# Patient Record
Sex: MALE | Race: WHITE | NOT HISPANIC OR LATINO | Employment: FULL TIME | URBAN - METROPOLITAN AREA
[De-identification: names, ages, dates, MRNs, and addresses within clinical notes are randomized per-mention and may not be internally consistent; named-entity substitution may affect disease eponyms.]

---

## 2021-05-28 ENCOUNTER — RECORDS - HEALTHEAST (OUTPATIENT)
Dept: ADMINISTRATIVE | Facility: CLINIC | Age: 66
End: 2021-05-28

## 2022-08-26 ENCOUNTER — APPOINTMENT (OUTPATIENT)
Dept: CT IMAGING | Facility: HOSPITAL | Age: 67
End: 2022-08-26
Payer: COMMERCIAL

## 2022-08-26 ENCOUNTER — HOSPITAL ENCOUNTER (EMERGENCY)
Facility: HOSPITAL | Age: 67
Discharge: HOME | End: 2022-08-26
Admitting: PHYSICIAN ASSISTANT
Payer: COMMERCIAL

## 2022-08-26 VITALS
OXYGEN SATURATION: 100 % | HEART RATE: 73 BPM | DIASTOLIC BLOOD PRESSURE: 91 MMHG | TEMPERATURE: 98 F | SYSTOLIC BLOOD PRESSURE: 163 MMHG | RESPIRATION RATE: 20 BRPM

## 2022-08-26 DIAGNOSIS — R31.0 GROSS HEMATURIA: ICD-10-CM

## 2022-08-26 LAB
ANION GAP SERPL CALCULATED.3IONS-SCNC: 9 MMOL/L (ref 5–18)
APTT PPP: 28 SECONDS (ref 22–38)
BUN SERPL-MCNC: 17 MG/DL (ref 8–22)
C REACTIVE PROTEIN LHE: 0.2 MG/DL (ref 0–?)
CALCIUM SERPL-MCNC: 9.5 MG/DL (ref 8.5–10.5)
CHLORIDE BLD-SCNC: 104 MMOL/L (ref 98–107)
CO2 SERPL-SCNC: 26 MMOL/L (ref 22–31)
CREAT SERPL-MCNC: 0.99 MG/DL (ref 0.7–1.3)
ERYTHROCYTE [DISTWIDTH] IN BLOOD BY AUTOMATED COUNT: 12.9 % (ref 10–15)
GFR SERPL CREATININE-BSD FRML MDRD: 85 ML/MIN/1.73M2
GLUCOSE BLD-MCNC: 97 MG/DL (ref 70–125)
HCT VFR BLD AUTO: 46.2 % (ref 40–53)
HGB BLD-MCNC: 15.6 G/DL (ref 13.3–17.7)
HOLD SPECIMEN: NORMAL
INR PPP: 1.57 (ref 0.85–1.15)
MCH RBC QN AUTO: 32.4 PG (ref 26.5–33)
MCHC RBC AUTO-ENTMCNC: 33.8 G/DL (ref 31.5–36.5)
MCV RBC AUTO: 96 FL (ref 78–100)
PLATELET # BLD AUTO: 206 10E3/UL (ref 150–450)
POTASSIUM BLD-SCNC: 4.4 MMOL/L (ref 3.5–5)
RBC # BLD AUTO: 4.81 10E6/UL (ref 4.4–5.9)
SODIUM SERPL-SCNC: 139 MMOL/L (ref 136–145)
WBC # BLD AUTO: 5.2 10E3/UL (ref 4–11)

## 2022-08-26 PROCEDURE — 86140 C-REACTIVE PROTEIN: CPT | Performed by: PHYSICIAN ASSISTANT

## 2022-08-26 PROCEDURE — 36415 COLL VENOUS BLD VENIPUNCTURE: CPT | Performed by: PHYSICIAN ASSISTANT

## 2022-08-26 PROCEDURE — 85730 THROMBOPLASTIN TIME PARTIAL: CPT | Performed by: PHYSICIAN ASSISTANT

## 2022-08-26 PROCEDURE — 80048 BASIC METABOLIC PNL TOTAL CA: CPT | Performed by: PHYSICIAN ASSISTANT

## 2022-08-26 PROCEDURE — 99284 EMERGENCY DEPT VISIT MOD MDM: CPT | Mod: 25

## 2022-08-26 PROCEDURE — 85027 COMPLETE CBC AUTOMATED: CPT | Performed by: PHYSICIAN ASSISTANT

## 2022-08-26 PROCEDURE — 85610 PROTHROMBIN TIME: CPT | Performed by: PHYSICIAN ASSISTANT

## 2022-08-26 PROCEDURE — 74176 CT ABD & PELVIS W/O CONTRAST: CPT

## 2022-08-26 ASSESSMENT — ACTIVITIES OF DAILY LIVING (ADL): ADLS_ACUITY_SCORE: 35

## 2022-08-26 ASSESSMENT — ENCOUNTER SYMPTOMS
FLANK PAIN: 1
HEMATURIA: 1

## 2022-08-26 NOTE — DISCHARGE INSTRUCTIONS
You have a tiny stone in the left kidney but nothing that would typically be causing blood in your urine or pain and this is likely an incidental finding.    Your kidney function is perfect as is your hemoglobin.     Follow up with [facility] for further non-emergent evaluation.    If you develop new/worsening symptoms including fevers, chills, urinating straight blood, unable to urinate, you need to return to the emergency department.

## 2022-08-26 NOTE — ED TRIAGE NOTES
Patient states blood in urine since Saturday, Tuesday he states there was what he thought to be a small clot. Had clear urine Wednesday and Thursday. Patient states he had blood in his urine again this morning. Right sided flank pain.

## 2022-08-26 NOTE — ED PROVIDER NOTES
EMERGENCY DEPARTMENT ENCOUNTER      NAME: Rene Holcomb  AGE: 67 year old male  YOB: 1955  MRN: [id number]  EVALUATION DATE & TIME: 8/26/2022 12:19 PM    PCP: No primary care provider on file.    ED PROVIDER: Saba Moore PA-C      Chief Complaint   Patient presents with     Hematuria         FINAL IMPRESSION:  1. Gross hematuria          MEDICAL DECISION MAKING:    Pertinent Labs & Imaging studies reviewed. (See chart for details)  67 year old male with a pertinent history of prostate cancer, colon cancer, DVT on xarelto, nephrolithiasis and hyperlipidemia presents to the Emergency Department for evaluation of intermittent hematuria x 1 week with occasional right flank pain. Denies fevers, chills, vomiting, dysuria.     Vitals reviewed and notable for elevated blood pressure. On exam, patient well appearing and in no acute distress. Abdomen soft and non-tender. No flank or CVA tenderness. Differential diagnosis includes but not limited to UTI, ureterolithiasis,  neoplasm, pharmacologic anticoagulation.    UA reviewed from clinic today with no evidence of infection. 3-5 RBCs. Hgb 15.6. INR 1.57. Renal function WNL. No leukocytosis. CRP WNL. CT abd pelvis with punctate 1 mm nonobstructing left renal stone. No other stones. No hydronephrosis. Patient hemodynamically stable, small amount of blood in urine, no anemia or acute renal insufficiency. Appropriate for outpatient urology follow up. Discussed importance given history of  cancer. Discussed return precautions. Urology referral placed and patient knows to follow up. Patient discharged home in stable condition.     0 minutes of critical care time     ED COURSE:  12:21 PM I met with the patient, obtained history, performed an initial exam, and discussed options and plan for diagnostics and treatment here in the ED.  12:34 PM Patient discharged after being provided with extensive anticipatory guidance and given return precautions, importance of  urology follow-up emphasized.    At the conclusion of the encounter I discussed the results of all of the tests and the disposition. The questions were answered. The patient acknowledged understanding and was agreeable with the care plan.     MEDICATIONS GIVEN IN THE EMERGENCY:  Medications - No data to display    NEW PRESCRIPTIONS STARTED AT TODAY'S ER VISIT  There are no discharge medications for this patient.           =================================================================    HPI:    Patient information was obtained from: Patient     Use of Interpretor: N/A       Rene Holcomb is a 67 year old male with a pertinent history of prostate cancer, colon cancer, DVT on xarelto, nephrolithiasis and hyperlipidemia who presents to this ED via private vehicle for evaluation of hematuria.    Patient reports intermittent hematuria for the last week. Occasional right flank pain. Anticoagulated on xarelto. Denies fevers, chills, dysuria, nausea, vomiting.    Per chart review: urinalysis done [facility] at [facility] prior to coming in to the ED. Results are below.     (ABNORMAL) URINALYSIS MICROSCOPIC (08/26/2022 9:36 AM CDT)  Lab Results - (ABNORMAL) URINALYSIS MICROSCOPIC (08/26/2022 9:36 AM CDT)  Component Value Ref Range Test Method Analysis Time Performed At Pathologist Signature   RBC 3-5 (A) 0-2, None Seen /HPF   08/26/2022 10:01 AM CDT [facility]     WBC 0-2 0-2, 3-5, None Seen /HPF   08/26/2022 10:01 AM CDT [facility]     BACTERIA  None Seen None Seen, Rare, Few Bacteria/HPF   08/26/2022 10:01 AM CDT [facility]     EPITHELIAL CELLS  Few None Seen, Few Epi/HPF   08/26/2022 10:01 AM CDT [facility]     Mucus Present     08/26/2022 10:01 AM CDT [facility]     GRANULAR CASTS 0-2 (A) (none) /LPF    08/26/2022 10:01 AM CDT [facility]       (ABNORMAL) UA W/ SEDIMENT EXAM REFLEXED PER  CRITERIA (08/26/2022 9:36 AM CDT)  Lab Results - (ABNORMAL) UA W/ SEDIMENT EXAM REFLEXED PER CRITERIA (08/26/2022 9:36 AM CDT)  Component Value Ref Range Test Method Analysis Time Performed At Pathologist Signature   COLOR  Yellow Yellow Color   08/26/2022 9:41 AM CDT [facility]     CLARITY  Clear Clear Clarity   08/26/2022 9:41 AM CDT [facility]     SPECIFIC GRAVITY,URINE  >=1.030 (A) 1.010, 1.015, 1.020, 1.025    08/26/2022 9:41 AM CDT [facility]     PH,URINE  6.0 6.0, 7.0, 8.0, 5.5, 6.5, 7.5, 8.5    08/26/2022 9:41 AM CDT [facility]     UROBILINOGEN,QUALITATIVE  Normal Normal EU/dl   08/26/2022 9:41 AM CDT [facility]     PROTEIN, URINE Negative Negative mg/dL   08/26/2022 9:41 AM CDT [facility]     GLUCOSE, URINE Negative Negative mg/dL   08/26/2022 9:41 AM CDT [facility]     KETONES,URINE  Negative Negative mg/dL   08/26/2022 9:41 AM CDT [facility]     BILIRUBIN,URINE  Negative Negative    08/26/2022 9:41 AM CDT [facility]     OCCULT BLOOD,URINE  Moderate (A) Negative    08/26/2022 9:41 AM CDT [facility]     NITRITE  Negative Negative    08/26/2022 9:41 AM CDT [facility]     LEUKOCYTE ESTERASE  Negative Negative    08/26/2022 9:41 AM CDT [facility]          REVIEW OF SYSTEMS:  Review of Systems   Constitutional: Negative for chills and fever.   Respiratory: Negative for shortness of breath.    Cardiovascular: Negative for chest pain.   Gastrointestinal: Negative for abdominal pain, diarrhea, nausea and vomiting.   Genitourinary: Positive for flank pain (Right) and hematuria. Negative for dysuria, frequency, penile discharge and penile pain.   Hematological: Bruises/bleeds easily (xarelto).   All other systems reviewed and are negative.      PAST MEDICAL HISTORY:  History reviewed. No  pertinent past medical history.    PAST SURGICAL HISTORY:  No past surgical history on file.        CURRENT MEDICATIONS:    No current facility-administered medications for this encounter.  No current outpatient medications on file.      ALLERGIES:  Not on File    FAMILY HISTORY:  No family history on file.    SOCIAL HISTORY:        VITALS:  Patient Vitals for the past 24 hrs:   BP Temp Temp src Pulse Resp SpO2   08/26/22 1057 (!) 163/91 98  F (36.7  C) Temporal 73 20 100 %       PHYSICAL EXAM    Constitutional: Well developed, Well nourished, NAD  HENT: Normocephalic, Atraumatic  Neck: Supple, No stridor.  Eyes: Conjunctiva normal, No discharge.   Respiratory: Normal breath sounds, No respiratory distress, No wheezing, Speaks full sentences easily. No cough.  Cardiovascular: Normal heart rate, Regular rhythm, No murmurs, No rubs, No gallops. Chest wall nontender.  GI: Soft, No tenderness, No masses, No flank or CVA tenderness. No rebound or guarding.  Musculoskeletal: No edema. No cyanosis, No clubbing. Good range of motion in all major joints.  Integument: Warm, Dry, No erythema, No rash. No petechiae.  Neurologic: Alert & oriented x 3, Normal motor function, Normal sensory function, No focal deficits noted. Normal gait.  Psychiatric: Affect normal, Judgment normal, Mood normal. Cooperative.    LAB:  All pertinent labs reviewed and interpreted.  Recent Results (from the past 24 hour(s))   CBC (+ platelets, no diff)    Collection Time: 08/26/22 11:40 AM   Result Value Ref Range    WBC Count 5.2 4.0 - 11.0 10e3/uL    RBC Count 4.81 4.40 - 5.90 10e6/uL    Hemoglobin 15.6 13.3 - 17.7 g/dL    Hematocrit 46.2 40.0 - 53.0 %    MCV 96 78 - 100 fL    MCH 32.4 26.5 - 33.0 pg    MCHC 33.8 31.5 - 36.5 g/dL    RDW 12.9 10.0 - 15.0 %    Platelet Count 206 150 - 450 10e3/uL   Basic metabolic panel    Collection Time: 08/26/22 11:40 AM   Result Value Ref Range    Sodium 139 136 - 145 mmol/L    Potassium 4.4 3.5 - 5.0 mmol/L     Chloride 104 98 - 107 mmol/L    Carbon Dioxide (CO2) 26 22 - 31 mmol/L    Anion Gap 9 5 - 18 mmol/L    Urea Nitrogen 17 8 - 22 mg/dL    Creatinine 0.99 0.70 - 1.30 mg/dL    Calcium 9.5 8.5 - 10.5 mg/dL    Glucose 97 70 - 125 mg/dL    GFR Estimate 85 >60 mL/min/1.73m2   CRP inflammation    Collection Time: 08/26/22 11:40 AM   Result Value Ref Range    CRP 0.2 0.0 - <0.8 mg/dL   Extra Blue Top Tube    Collection Time: 08/26/22 11:40 AM   Result Value Ref Range    Hold Specimen JIC    INR    Collection Time: 08/26/22 11:40 AM   Result Value Ref Range    INR 1.57 (H) 0.85 - 1.15   PTT    Collection Time: 08/26/22 11:40 AM   Result Value Ref Range    aPTT 28 22 - 38 Seconds         RADIOLOGY:  Reviewed all pertinent imaging. Please see official radiology report.  Abd/pelvis CT no contrast - Stone Protocol   Final Result   IMPRESSION:       1.  Punctate 1 mm nonobstructing left renal stone. No other stones. No hydronephrosis.      2.  Moderate hepatic steatosis.      3.  No diverticulitis. No bowel obstruction.      4.  No free fluid or air. No abscess.                I, Dimitri Dior, am serving as a scribe to document services personally performed by Saba Moore PA-C based on my observation and the provider's statements to me. I, Saba Moore PA-C attest that Dimitri Dior is acting in a scribe capacity, has observed my performance of the services and has documented them in accordance with my direction.    Saba Moore PA-C  Emergency Medicine  [facility]  8/26/2022     Saba Moore PA-C  08/27/22 2111

## 2022-08-27 ASSESSMENT — ENCOUNTER SYMPTOMS
BRUISES/BLEEDS EASILY: 1
FEVER: 0
SHORTNESS OF BREATH: 0
VOMITING: 0
ABDOMINAL PAIN: 0
DIARRHEA: 0
FREQUENCY: 0
CHILLS: 0
DYSURIA: 0
NAUSEA: 0

## 2022-11-21 ENCOUNTER — HEALTH MAINTENANCE LETTER (OUTPATIENT)
Age: 67
End: 2022-11-21

## 2023-07-08 ENCOUNTER — HEALTH MAINTENANCE LETTER (OUTPATIENT)
Age: 68
End: 2023-07-08

## 2024-08-31 ENCOUNTER — HEALTH MAINTENANCE LETTER (OUTPATIENT)
Age: 69
End: 2024-08-31